# Patient Record
Sex: FEMALE | Race: OTHER | HISPANIC OR LATINO
[De-identification: names, ages, dates, MRNs, and addresses within clinical notes are randomized per-mention and may not be internally consistent; named-entity substitution may affect disease eponyms.]

---

## 2017-03-22 ENCOUNTER — APPOINTMENT (OUTPATIENT)
Dept: OTHER | Facility: CLINIC | Age: 52
End: 2017-03-22

## 2017-03-22 VITALS
HEART RATE: 80 BPM | OXYGEN SATURATION: 100 % | DIASTOLIC BLOOD PRESSURE: 69 MMHG | RESPIRATION RATE: 14 BRPM | SYSTOLIC BLOOD PRESSURE: 111 MMHG

## 2017-03-22 DIAGNOSIS — Z83.3 FAMILY HISTORY OF DIABETES MELLITUS: ICD-10-CM

## 2017-04-12 ENCOUNTER — APPOINTMENT (OUTPATIENT)
Dept: OTHER | Facility: CLINIC | Age: 52
End: 2017-04-12

## 2017-04-12 VITALS
HEART RATE: 77 BPM | SYSTOLIC BLOOD PRESSURE: 106 MMHG | DIASTOLIC BLOOD PRESSURE: 69 MMHG | OXYGEN SATURATION: 97 % | HEIGHT: 58 IN | BODY MASS INDEX: 30.44 KG/M2 | WEIGHT: 145 LBS

## 2017-05-17 ENCOUNTER — APPOINTMENT (OUTPATIENT)
Dept: OTHER | Facility: CLINIC | Age: 52
End: 2017-05-17

## 2017-08-15 ENCOUNTER — RESULT CHARGE (OUTPATIENT)
Age: 52
End: 2017-08-15

## 2017-08-16 ENCOUNTER — APPOINTMENT (OUTPATIENT)
Dept: OTHER | Facility: CLINIC | Age: 52
End: 2017-08-16
Payer: COMMERCIAL

## 2017-08-16 VITALS
DIASTOLIC BLOOD PRESSURE: 66 MMHG | SYSTOLIC BLOOD PRESSURE: 102 MMHG | HEIGHT: 58 IN | BODY MASS INDEX: 29.39 KG/M2 | HEART RATE: 66 BPM | WEIGHT: 140 LBS | OXYGEN SATURATION: 100 % | RESPIRATION RATE: 14 BRPM

## 2017-08-16 VITALS
WEIGHT: 140 LBS | SYSTOLIC BLOOD PRESSURE: 102 MMHG | HEART RATE: 66 BPM | OXYGEN SATURATION: 100 % | DIASTOLIC BLOOD PRESSURE: 66 MMHG | BODY MASS INDEX: 29.39 KG/M2 | HEIGHT: 58 IN

## 2017-08-16 PROCEDURE — 99214 OFFICE O/P EST MOD 30 MIN: CPT | Mod: 25

## 2017-08-16 PROCEDURE — 96150: CPT

## 2017-08-16 PROCEDURE — 99396 PREV VISIT EST AGE 40-64: CPT

## 2017-08-16 PROCEDURE — 94010 BREATHING CAPACITY TEST: CPT

## 2017-08-16 RX ORDER — AZELASTINE HYDROCHLORIDE 137 UG/1
0.1 SPRAY, METERED NASAL DAILY
Qty: 1 | Refills: 5 | Status: COMPLETED | COMMUNITY
Start: 2017-03-22 | End: 2017-08-16

## 2017-08-16 RX ORDER — TRIAMCINOLONE ACETONIDE 55 UG/1
55 SPRAY, METERED NASAL TWICE DAILY
Qty: 1 | Refills: 5 | Status: COMPLETED | COMMUNITY
Start: 2017-03-22 | End: 2017-08-16

## 2017-08-18 LAB
ALBUMIN SERPL ELPH-MCNC: 3.7 G/DL
ALP BLD-CCNC: 170 U/L
ALT SERPL-CCNC: 43 U/L
ANION GAP SERPL CALC-SCNC: 18 MMOL/L
APPEARANCE: CLEAR
AST SERPL-CCNC: 51 U/L
BASOPHILS # BLD AUTO: 0.04 K/UL
BASOPHILS NFR BLD AUTO: 0.5 %
BILIRUB SERPL-MCNC: 0.8 MG/DL
BILIRUBIN URINE: NEGATIVE
BLOOD URINE: ABNORMAL
BUN SERPL-MCNC: 21 MG/DL
CALCIUM SERPL-MCNC: 9.4 MG/DL
CHLORIDE SERPL-SCNC: 104 MMOL/L
CHOLEST SERPL-MCNC: 132 MG/DL
CHOLEST/HDLC SERPL: 2.2 RATIO
CO2 SERPL-SCNC: 21 MMOL/L
COLOR: YELLOW
CREAT SERPL-MCNC: 0.86 MG/DL
EOSINOPHIL # BLD AUTO: 0.14 K/UL
EOSINOPHIL NFR BLD AUTO: 1.9 %
GLUCOSE QUALITATIVE U: NORMAL MG/DL
GLUCOSE SERPL-MCNC: 144 MG/DL
HCT VFR BLD CALC: 38.2 %
HDLC SERPL-MCNC: 60 MG/DL
HGB BLD-MCNC: 12.3 G/DL
IMM GRANULOCYTES NFR BLD AUTO: 0.1 %
KETONES URINE: NEGATIVE
LDLC SERPL CALC-MCNC: 58 MG/DL
LEUKOCYTE ESTERASE URINE: NEGATIVE
LYMPHOCYTES # BLD AUTO: 3.07 K/UL
LYMPHOCYTES NFR BLD AUTO: 41.3 %
MAN DIFF?: NORMAL
MCHC RBC-ENTMCNC: 29.2 PG
MCHC RBC-ENTMCNC: 32.2 GM/DL
MCV RBC AUTO: 90.7 FL
MONOCYTES # BLD AUTO: 0.54 K/UL
MONOCYTES NFR BLD AUTO: 7.3 %
NEUTROPHILS # BLD AUTO: 3.64 K/UL
NEUTROPHILS NFR BLD AUTO: 48.9 %
NITRITE URINE: NEGATIVE
PH URINE: 6.5
PLATELET # BLD AUTO: 129 K/UL
POTASSIUM SERPL-SCNC: 4.4 MMOL/L
PROT SERPL-MCNC: 7.4 G/DL
PROTEIN URINE: NEGATIVE MG/DL
RBC # BLD: 4.21 M/UL
RBC # FLD: 16.7 %
SODIUM SERPL-SCNC: 143 MMOL/L
SPECIFIC GRAVITY URINE: 1.02
TRIGL SERPL-MCNC: 68 MG/DL
UROBILINOGEN URINE: NORMAL MG/DL
WBC # FLD AUTO: 7.44 K/UL

## 2017-09-26 ENCOUNTER — APPOINTMENT (OUTPATIENT)
Dept: PLASTIC SURGERY | Facility: CLINIC | Age: 52
End: 2017-09-26

## 2017-10-25 ENCOUNTER — APPOINTMENT (OUTPATIENT)
Dept: PLASTIC SURGERY | Facility: CLINIC | Age: 52
End: 2017-10-25

## 2018-01-03 ENCOUNTER — APPOINTMENT (OUTPATIENT)
Dept: OTHER | Facility: CLINIC | Age: 53
End: 2018-01-03
Payer: COMMERCIAL

## 2018-01-03 ENCOUNTER — APPOINTMENT (OUTPATIENT)
Dept: OTHER | Facility: CLINIC | Age: 53
End: 2018-01-03

## 2018-01-03 VITALS
HEART RATE: 76 BPM | WEIGHT: 134 LBS | SYSTOLIC BLOOD PRESSURE: 129 MMHG | OXYGEN SATURATION: 100 % | HEIGHT: 58 IN | DIASTOLIC BLOOD PRESSURE: 82 MMHG | BODY MASS INDEX: 28.13 KG/M2 | RESPIRATION RATE: 15 BRPM

## 2018-01-03 PROCEDURE — 99214 OFFICE O/P EST MOD 30 MIN: CPT

## 2018-01-03 RX ORDER — ALBUTEROL SULFATE 90 UG/1
108 (90 BASE) AEROSOL, METERED RESPIRATORY (INHALATION) EVERY 6 HOURS
Qty: 1 | Refills: 6 | Status: COMPLETED | COMMUNITY
Start: 2017-03-22 | End: 2018-01-03

## 2018-02-23 ENCOUNTER — APPOINTMENT (OUTPATIENT)
Dept: OTOLARYNGOLOGY | Facility: CLINIC | Age: 53
End: 2018-02-23

## 2018-04-04 ENCOUNTER — APPOINTMENT (OUTPATIENT)
Dept: OTHER | Facility: CLINIC | Age: 53
End: 2018-04-04
Payer: COMMERCIAL

## 2018-04-04 VITALS
WEIGHT: 133 LBS | OXYGEN SATURATION: 100 % | HEART RATE: 73 BPM | BODY MASS INDEX: 27.92 KG/M2 | SYSTOLIC BLOOD PRESSURE: 100 MMHG | RESPIRATION RATE: 16 BRPM | HEIGHT: 58 IN | DIASTOLIC BLOOD PRESSURE: 63 MMHG

## 2018-04-04 DIAGNOSIS — L29.9 PRURITUS, UNSPECIFIED: ICD-10-CM

## 2018-04-04 PROCEDURE — 99214 OFFICE O/P EST MOD 30 MIN: CPT

## 2018-04-04 RX ORDER — FLUTICASONE PROPIONATE 50 UG/1
50 SPRAY, METERED NASAL DAILY
Qty: 1 | Refills: 6 | Status: COMPLETED | COMMUNITY
Start: 2017-08-16 | End: 2018-04-04

## 2018-04-04 RX ORDER — ALUMINUM HYDROXIDE AND MAGNESIUM TRISILICATE 80; 14.2 MG/1; MG/1
80-14.2 TABLET, CHEWABLE ORAL 4 TIMES DAILY
Qty: 120 | Refills: 5 | Status: COMPLETED | COMMUNITY
Start: 2018-01-03 | End: 2018-04-04

## 2018-06-27 ENCOUNTER — APPOINTMENT (OUTPATIENT)
Dept: OTHER | Facility: CLINIC | Age: 53
End: 2018-06-27
Payer: COMMERCIAL

## 2018-06-27 VITALS — SYSTOLIC BLOOD PRESSURE: 100 MMHG | RESPIRATION RATE: 16 BRPM | DIASTOLIC BLOOD PRESSURE: 60 MMHG | HEART RATE: 74 BPM

## 2018-06-27 PROCEDURE — 99214 OFFICE O/P EST MOD 30 MIN: CPT

## 2018-06-27 RX ORDER — HYDROCORTISONE 0.5 G/100G
0.5 CREAM TOPICAL 3 TIMES DAILY
Qty: 1 | Refills: 6 | Status: COMPLETED | COMMUNITY
Start: 2018-04-04 | End: 2018-06-27

## 2018-06-27 RX ORDER — TRIAMCINOLONE ACETONIDE 55 UG/1
55 SPRAY, METERED NASAL TWICE DAILY
Qty: 1 | Refills: 6 | Status: COMPLETED | COMMUNITY
Start: 2018-04-04 | End: 2018-06-27

## 2018-10-03 ENCOUNTER — APPOINTMENT (OUTPATIENT)
Dept: OTHER | Facility: CLINIC | Age: 53
End: 2018-10-03

## 2018-11-28 ENCOUNTER — APPOINTMENT (OUTPATIENT)
Dept: OTHER | Facility: CLINIC | Age: 53
End: 2018-11-28
Payer: COMMERCIAL

## 2018-11-28 ENCOUNTER — LABORATORY RESULT (OUTPATIENT)
Age: 53
End: 2018-11-28

## 2018-11-28 VITALS
DIASTOLIC BLOOD PRESSURE: 68 MMHG | SYSTOLIC BLOOD PRESSURE: 102 MMHG | HEART RATE: 58 BPM | BODY MASS INDEX: 27.29 KG/M2 | OXYGEN SATURATION: 99 % | WEIGHT: 130 LBS | RESPIRATION RATE: 16 BRPM | HEIGHT: 58 IN

## 2018-11-28 PROCEDURE — 99396 PREV VISIT EST AGE 40-64: CPT | Mod: 25

## 2018-11-28 PROCEDURE — 99214 OFFICE O/P EST MOD 30 MIN: CPT | Mod: 25

## 2018-11-28 PROCEDURE — 96150: CPT

## 2018-11-28 PROCEDURE — 94010 BREATHING CAPACITY TEST: CPT

## 2018-11-28 RX ORDER — MONTELUKAST 10 MG/1
10 TABLET, FILM COATED ORAL
Qty: 30 | Refills: 6 | Status: COMPLETED | COMMUNITY
Start: 2018-04-04 | End: 2018-11-28

## 2018-11-29 LAB
ALBUMIN SERPL ELPH-MCNC: 3.6 G/DL
ALP BLD-CCNC: 153 U/L
ALT SERPL-CCNC: 30 U/L
ANION GAP SERPL CALC-SCNC: 9 MMOL/L
APPEARANCE: CLEAR
AST SERPL-CCNC: 40 U/L
BASOPHILS # BLD AUTO: 0.04 K/UL
BASOPHILS NFR BLD AUTO: 0.8 %
BILIRUB SERPL-MCNC: 0.8 MG/DL
BILIRUBIN URINE: NEGATIVE
BLOOD URINE: ABNORMAL
BUN SERPL-MCNC: 13 MG/DL
CALCIUM SERPL-MCNC: 10 MG/DL
CHLORIDE SERPL-SCNC: 109 MMOL/L
CHOLEST SERPL-MCNC: 136 MG/DL
CHOLEST/HDLC SERPL: 2.3 RATIO
CO2 SERPL-SCNC: 24 MMOL/L
COLOR: YELLOW
CREAT SERPL-MCNC: 0.6 MG/DL
EOSINOPHIL # BLD AUTO: 0.23 K/UL
EOSINOPHIL NFR BLD AUTO: 4.3 %
GLUCOSE QUALITATIVE U: NEGATIVE MG/DL
GLUCOSE SERPL-MCNC: 97 MG/DL
HCT VFR BLD CALC: 42.1 %
HDLC SERPL-MCNC: 58 MG/DL
HGB BLD-MCNC: 13.8 G/DL
IMM GRANULOCYTES NFR BLD AUTO: 0 %
KETONES URINE: NEGATIVE
LDLC SERPL CALC-MCNC: 53 MG/DL
LEUKOCYTE ESTERASE URINE: NEGATIVE
LYMPHOCYTES # BLD AUTO: 1.9 K/UL
LYMPHOCYTES NFR BLD AUTO: 35.7 %
MAN DIFF?: NORMAL
MCHC RBC-ENTMCNC: 32.3 PG
MCHC RBC-ENTMCNC: 32.8 GM/DL
MCV RBC AUTO: 98.6 FL
MONOCYTES # BLD AUTO: 0.43 K/UL
MONOCYTES NFR BLD AUTO: 8.1 %
NEUTROPHILS # BLD AUTO: 2.72 K/UL
NEUTROPHILS NFR BLD AUTO: 51.1 %
NITRITE URINE: NEGATIVE
PH URINE: 8.5
PLATELET # BLD AUTO: 118 K/UL
POTASSIUM SERPL-SCNC: 4.2 MMOL/L
PROT SERPL-MCNC: 6.9 G/DL
PROTEIN URINE: NEGATIVE MG/DL
RBC # BLD: 4.27 M/UL
RBC # FLD: 14 %
SODIUM SERPL-SCNC: 142 MMOL/L
SPECIFIC GRAVITY URINE: 1.02
TRIGL SERPL-MCNC: 127 MG/DL
UROBILINOGEN URINE: NEGATIVE MG/DL
WBC # FLD AUTO: 5.32 K/UL

## 2018-12-02 ENCOUNTER — FORM ENCOUNTER (OUTPATIENT)
Age: 53
End: 2018-12-02

## 2018-12-07 ENCOUNTER — MESSAGE (OUTPATIENT)
Age: 53
End: 2018-12-07

## 2019-02-10 ENCOUNTER — FORM ENCOUNTER (OUTPATIENT)
Age: 54
End: 2019-02-10

## 2019-04-03 ENCOUNTER — APPOINTMENT (OUTPATIENT)
Dept: OTHER | Facility: CLINIC | Age: 54
End: 2019-04-03
Payer: COMMERCIAL

## 2019-04-03 VITALS
OXYGEN SATURATION: 98 % | BODY MASS INDEX: 28.13 KG/M2 | RESPIRATION RATE: 18 BRPM | HEART RATE: 76 BPM | SYSTOLIC BLOOD PRESSURE: 103 MMHG | HEIGHT: 58 IN | WEIGHT: 134 LBS | DIASTOLIC BLOOD PRESSURE: 68 MMHG | TEMPERATURE: 97.6 F

## 2019-04-03 PROCEDURE — 99214 OFFICE O/P EST MOD 30 MIN: CPT

## 2019-04-03 RX ORDER — PANTOPRAZOLE 20 MG/1
20 TABLET, DELAYED RELEASE ORAL TWICE DAILY
Qty: 60 | Refills: 6 | Status: COMPLETED | COMMUNITY
Start: 2017-03-22 | End: 2019-04-03

## 2019-04-03 RX ORDER — FLUTICASONE PROPIONATE 50 UG/1
50 SPRAY, METERED NASAL DAILY
Qty: 1 | Refills: 6 | Status: COMPLETED | COMMUNITY
Start: 2018-11-28 | End: 2019-04-03

## 2019-04-03 NOTE — ASSESSMENT
[FreeTextEntry1] : labs 3/25/19: platelest 112.000; glucose 140 with normal hga1c, alk phos 196 (nl 147); ast 40 (nl 39); tg 220no rmal thyroid. c4 15 (NL 19-52), 3+ BLOOD IN URINE, CRP NEG, RF NEG,anticcp neg, dnads nl, sm ab, scl ab, ssa and ssb all nromal; ventura d 49; yurine culture 20K-50K e coli. emg/ncv 3/25/19: l5-l4 nerve root irritation on L and b mild sensory cts \par ct sinues 3/9/19: r deviated septum with spur with mucosas nasal hyperthrophy, mild mucosal thickening omu infundibiula. \par a. overall stable, persistent nasal bleeding. cts is active and lumbar radiculopathy is active.\par p. will stop flonase because of nasal bleeding, change to ipratropium. pt is under care with an ent but will get a second opinion through the program. local meds recommended due to cirrhosis: lidocaine patches and diclofenac cream for pain. splints for cts and home exercise program. will request colonoscopy as pt states she has not had one in several years. no c4, case is closed, pending ssdb. pt is totally disabled due to multiple medical conditions. rtc in 4 mo. \par

## 2019-04-03 NOTE — HISTORY OF PRESENT ILLNESS
[FreeTextEntry1] : certified for rhinitis, laryngitis, copd, gerd, cts, cervical and lumbar radiculopathy\par s. patient reports worsening numbness in both hands. she was doing some hand activities and this resulted in worsening pain. was sent to a rheumatologist who diagnosed her with cts. continues bleeding from her nose, especially right nostril. continues with headache. reports numbness in her legs, unable to stand for long periods of time, unable to stand more than 30 min. reportedly got a ct scan of her sinuses but has not gotten the results. \par her cirrhosis is stable.\par ss is pending. 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

## 2019-04-03 NOTE — PHYSICAL EXAM
[General Appearance - Alert] : alert [General Appearance - In No Acute Distress] : in no acute distress [General Appearance - Well Nourished] : well nourished [General Appearance - Well Developed] : well developed [Sclera] : the sclera and conjunctiva were normal [PERRL With Normal Accommodation] : pupils were equal in size, round, and reactive to light [Extraocular Movements] : extraocular movements were intact [Outer Ear] : the ears and nose were normal in appearance [Neck Appearance] : the appearance of the neck was normal [Neck Cervical Mass (___cm)] : no neck mass was observed [Jugular Venous Distention Increased] : there was no jugular-venous distention [Thyroid Diffuse Enlargement] : the thyroid was not enlarged [Respiration, Rhythm And Depth] : normal respiratory rhythm and effort [Exaggerated Use Of Accessory Muscles For Inspiration] : no accessory muscle use [Auscultation Breath Sounds / Voice Sounds] : lungs were clear to auscultation bilaterally [Chest Palpation] : palpation of the chest revealed no abnormalities [Apical Impulse] : the apical impulse was normal [Heart Rate And Rhythm] : heart rate was normal and rhythm regular [Heart Sounds] : normal S1 and S2 [Heart Sounds Gallop] : no gallops [Murmurs] : no murmurs [Edema] : there was no peripheral edema [Bowel Sounds] : normal bowel sounds [Abdomen Soft] : soft [Abdomen Tenderness] : non-tender [] : no hepato-splenomegaly [Abdomen Mass (___ Cm)] : no abdominal mass palpated [Cervical Lymph Nodes Enlarged Posterior Bilaterally] : posterior cervical [Cervical Lymph Nodes Enlarged Anterior Bilaterally] : anterior cervical [Supraclavicular Lymph Nodes Enlarged Bilaterally] : supraclavicular [Axillary Lymph Nodes Enlarged Bilaterally] : axillary [FreeTextEntry1] : positive tinel on r side to 2-3 digits; on l to 5 th, dysesthesia to touch r index and l pinky, strength 4/5 r hand.

## 2019-05-20 ENCOUNTER — APPOINTMENT (OUTPATIENT)
Dept: GASTROENTEROLOGY | Facility: CLINIC | Age: 54
End: 2019-05-20

## 2019-06-19 ENCOUNTER — APPOINTMENT (OUTPATIENT)
Dept: OTHER | Facility: CLINIC | Age: 54
End: 2019-06-19
Payer: COMMERCIAL

## 2019-06-19 VITALS
RESPIRATION RATE: 18 BRPM | TEMPERATURE: 98.5 F | HEART RATE: 72 BPM | BODY MASS INDEX: 28.34 KG/M2 | SYSTOLIC BLOOD PRESSURE: 99 MMHG | DIASTOLIC BLOOD PRESSURE: 63 MMHG | OXYGEN SATURATION: 98 % | HEIGHT: 58 IN | WEIGHT: 135 LBS

## 2019-06-19 PROCEDURE — 99213 OFFICE O/P EST LOW 20 MIN: CPT

## 2019-06-19 NOTE — HISTORY OF PRESENT ILLNESS
[FreeTextEntry1] : certified for rhinitis, laryngitis, coopd, gerd, cts, cervical and lumbar radiculopathy\par s. patient here for disability papers. patient also reports some rash in her skin, itchy. she is under dermatology evaluation for this. \par she is due for a regular apptm in a few weeks.

## 2019-06-19 NOTE — ASSESSMENT
[FreeTextEntry1] : a. documentation completed. patient has multiple medical conditions that render her totally disabled for any regular occupation. she has positive findings on physical exam and tests that support her diagnoses. she has been certified by the wtc program for the conditions above mentioned with positive clinical history, physical exams and supporting tests. patient has also mental health conditions related to her wtc exposure as well. patient has other physical conditions that add to her overall clinical conditoin, especially cirrhosis, with low platelet count. it is my impression ,with sufficient degree of medical certainty, tht this patient is totally disabled for any regular, full time occupation. she is totally disabled from a respiratory point of view for any occupations that entail exposure to fumes, dust, extremes of temperature and humidity, strong odors, chemicals. \par p. counseling and support. documentation completed. rtc for regular visit.

## 2019-06-19 NOTE — PHYSICAL EXAM
[FreeTextEntry1] : neck rotation to 45-60 degrees b with pain on the same side of rotation. some neck spasm. positive spurling's b for neck pain. shoulder abd to 130 degrees, positive impingement sign. positive tinel's at b elbows to 4-5 fingers. pain on forearm supination. postiive tinel's at b ulnar nerves at guyon's with anatomic distribution. no sensory changes at skin.  strength 4/5 b. back flexion to 60 degrees, extension to 0-10 degrees. abnormal reflexes right knee, left ++. no sensory changes in skin. weakness in b lower extremities to opposed knee extension/flexion.

## 2019-07-22 ENCOUNTER — APPOINTMENT (OUTPATIENT)
Dept: GASTROENTEROLOGY | Facility: CLINIC | Age: 54
End: 2019-07-22

## 2019-08-07 ENCOUNTER — APPOINTMENT (OUTPATIENT)
Dept: OTHER | Facility: CLINIC | Age: 54
End: 2019-08-07

## 2020-02-26 ENCOUNTER — APPOINTMENT (OUTPATIENT)
Dept: OTHER | Facility: CLINIC | Age: 55
End: 2020-02-26
Payer: COMMERCIAL

## 2020-02-26 VITALS
HEIGHT: 58 IN | TEMPERATURE: 97.8 F | OXYGEN SATURATION: 98 % | DIASTOLIC BLOOD PRESSURE: 57 MMHG | RESPIRATION RATE: 18 BRPM | BODY MASS INDEX: 28.34 KG/M2 | HEART RATE: 64 BPM | WEIGHT: 135 LBS | SYSTOLIC BLOOD PRESSURE: 94 MMHG

## 2020-02-26 PROCEDURE — 99396 PREV VISIT EST AGE 40-64: CPT | Mod: 25

## 2020-02-26 PROCEDURE — 99214 OFFICE O/P EST MOD 30 MIN: CPT | Mod: 25

## 2020-02-26 PROCEDURE — 94010 BREATHING CAPACITY TEST: CPT

## 2020-02-26 RX ORDER — PROMETHAZINE HYDROCHLORIDE AND DEXTROMETHORPHAN HYDROBROMIDE ORAL SOLUTION 15; 6.25 MG/5ML; MG/5ML
6.25-15 SOLUTION ORAL
Qty: 1 | Refills: 3 | Status: COMPLETED | COMMUNITY
Start: 2017-08-16 | End: 2020-02-26

## 2020-02-26 RX ORDER — LIDOCAINE 5% 700 MG/1
5 PATCH TOPICAL
Qty: 1 | Refills: 6 | Status: COMPLETED | COMMUNITY
Start: 2017-03-22 | End: 2020-02-26

## 2020-02-26 RX ORDER — CLOTRIMAZOLE AND BETAMETHASONE DIPROPIONATE 10; .5 MG/G; MG/G
1-0.05 CREAM TOPICAL TWICE DAILY
Qty: 1 | Refills: 6 | Status: ACTIVE | COMMUNITY
Start: 2018-11-28 | End: 1900-01-01

## 2020-02-26 RX ORDER — CLOTRIMAZOLE AND BETAMETHASONE DIPROPIONATE 10; .5 MG/ML; MG/ML
1-0.05 LOTION TOPICAL TWICE DAILY
Qty: 2 | Refills: 6 | Status: COMPLETED | COMMUNITY
Start: 2018-06-27 | End: 2020-02-26

## 2020-02-26 RX ORDER — NAPROXEN 500 MG/1
500 TABLET ORAL
Qty: 60 | Refills: 6 | Status: COMPLETED | COMMUNITY
Start: 2017-08-16 | End: 2020-02-26

## 2020-02-26 NOTE — PHYSICAL EXAM
[General Appearance - Alert] : alert [General Appearance - In No Acute Distress] : in no acute distress [General Appearance - Well Nourished] : well nourished [General Appearance - Well Developed] : well developed [Sclera] : the sclera and conjunctiva were normal [Extraocular Movements] : extraocular movements were intact [PERRL With Normal Accommodation] : pupils were equal in size, round, and reactive to light [Outer Ear] : the ears and nose were normal in appearance [Neck Appearance] : the appearance of the neck was normal [Neck Cervical Mass (___cm)] : no neck mass was observed [Thyroid Diffuse Enlargement] : the thyroid was not enlarged [Jugular Venous Distention Increased] : there was no jugular-venous distention [Exaggerated Use Of Accessory Muscles For Inspiration] : no accessory muscle use [Auscultation Breath Sounds / Voice Sounds] : lungs were clear to auscultation bilaterally [Respiration, Rhythm And Depth] : normal respiratory rhythm and effort [Chest Palpation] : palpation of the chest revealed no abnormalities [Apical Impulse] : the apical impulse was normal [Heart Sounds] : normal S1 and S2 [Heart Rate And Rhythm] : heart rate was normal and rhythm regular [Edema] : there was no peripheral edema [Heart Sounds Gallop] : no gallops [Murmurs] : no murmurs [Abdomen Soft] : soft [Bowel Sounds] : normal bowel sounds [] : no hepato-splenomegaly [Abdomen Mass (___ Cm)] : no abdominal mass palpated [Cervical Lymph Nodes Enlarged Anterior Bilaterally] : anterior cervical [Supraclavicular Lymph Nodes Enlarged Bilaterally] : supraclavicular [Cervical Lymph Nodes Enlarged Posterior Bilaterally] : posterior cervical [Axillary Lymph Nodes Enlarged Bilaterally] : axillary [FreeTextEntry1] : patient has a tic on the right side of her face.

## 2020-02-26 NOTE — DISCUSSION/SUMMARY
[Patient seen for WTC Monitoring ___] : Patient was seen for WTC monitoring [unfilled] [Please See Note in Chart and Documentation in Trial DB] : Please see note in chart and documentation in Trial DB. [FreeTextEntry3] : certified for rhinitis, laryngitis, copd, gerd, cts, cervical and lumbar radiculopathy\par s. patient reports worsening epigastric pain. she is on protonix one bid, which is not helping. \par she was seen by her pmd, got endoscopies, was found to have a gastric ulcer. \par has been referred for liver transplant program. \par continues with numbness in both hands. continues bleeding from her nose. reports numbness in her legs, unable to stand for long periods of time, unable to stand more than 30 min. \par patient was very sick in december, she got urinary bleeding. was treated by her pmd. \par patient got ss. patient is not working.\par Constitutional: alert, in no acute distress, well nourished and well developed . spontaneous twitching in her r side of the face. \par Eyes: the sclera and conjunctiva were normal, pupils were equal in size, round, and reactive to light and extraocular movements were intact. \par ENT: the ears and nose were normal in appearance. No bleeding noted. no swelling of turbinates. deviated septum to the right. no tenderness at sinuses. no secretions. oropharynx: scattered areas of swollen mucosa throughout, no secretions. \par Neck: the appearance of the neck was normal, the neck was supple, no neck mass was observed and the thyroid was not enlarged . there was no jugular-venous distention. \par Pulmonary: no respiratory distress, normal respiratory rhythm and effort, no accessory muscle use, lungs were clear to auscultation bilaterally and palpation of the chest revealed no abnormalities. \par Heart: the apical impulse was normal, heart rate was normal and rhythm regular, normal S1 and S2, no gallops and no murmurs. \par Vascular:. there was no peripheral edema. \par Abdomen: normal bowel sounds, soft, no hepato-splenomegaly and no abdominal mass palpated . tender at epigastrium no rebound. \par Lymphatics: The posterior cervical, anterior cervical, supraclavicular and axillary nodes were non-tender and normal size. \par Neurological:. patient has a tic on the right side of her face. \par a dn p. documentaion completed.

## 2020-02-26 NOTE — ASSESSMENT
[FreeTextEntry1] : med records' \par endoscpy 1-: esophagitis erosive, gastritis, gastric ulcer. overall negativ biopsies for ca. negative for h piory, \par colonoscpy 1-: hemorrhoids\par labs:  high iga, sl hig amilase, abnormal lfts, patelets 132, high pt, shantelle positive 1:160\par abdomen ct 1-: cirrhosis, recanalized umbilical vein, numeros bilateral renal calculi\par ct urogram 7-: bilateral calculi, cirrhosis, portal hypertension\par afsaneh today: fvc 1.94, 73%; fev1 1.71, 82%; ratio 88, 110%; flow volume normal, fet 3.5 sec; study shows restriction, loss of 107 ml/yr fev1 since 2013 (fev1 2.46)\par a. overall stable. persistent nasal bleeding. cirrhosis. has been found to have gi ulcer. \par p. continue ipratropium, olopatadine. continue local meds for musculoskeletal conditions due to cirrhosis: diclofenac cream. pantoprazole bid and sucralfate for gi ulcer. ventolin prn. will do endoscopy in some 3-4 mo to assess for gi ulcer. mammo and thyroid u/d today, pt reports a history of thyroid nodule. no c4, case is closed. pt is totally disabled due to multiple medical conditions. rtc in 4 mo. request to sign for long term pharmacy. \par

## 2020-02-26 NOTE — PHYSICAL EXAM
[General Appearance - Alert] : alert [General Appearance - In No Acute Distress] : in no acute distress [General Appearance - Well Nourished] : well nourished [Sclera] : the sclera and conjunctiva were normal [General Appearance - Well Developed] : well developed [Outer Ear] : the ears and nose were normal in appearance [PERRL With Normal Accommodation] : pupils were equal in size, round, and reactive to light [Extraocular Movements] : extraocular movements were intact [Neck Appearance] : the appearance of the neck was normal [Neck Cervical Mass (___cm)] : no neck mass was observed [Jugular Venous Distention Increased] : there was no jugular-venous distention [Thyroid Diffuse Enlargement] : the thyroid was not enlarged [Respiration, Rhythm And Depth] : normal respiratory rhythm and effort [Auscultation Breath Sounds / Voice Sounds] : lungs were clear to auscultation bilaterally [Exaggerated Use Of Accessory Muscles For Inspiration] : no accessory muscle use [Chest Palpation] : palpation of the chest revealed no abnormalities [Apical Impulse] : the apical impulse was normal [Heart Rate And Rhythm] : heart rate was normal and rhythm regular [Heart Sounds] : normal S1 and S2 [Murmurs] : no murmurs [Edema] : there was no peripheral edema [Heart Sounds Gallop] : no gallops [Bowel Sounds] : normal bowel sounds [Abdomen Soft] : soft [Abdomen Mass (___ Cm)] : no abdominal mass palpated [] : no hepato-splenomegaly [Cervical Lymph Nodes Enlarged Anterior Bilaterally] : anterior cervical [Cervical Lymph Nodes Enlarged Posterior Bilaterally] : posterior cervical [Supraclavicular Lymph Nodes Enlarged Bilaterally] : supraclavicular [Axillary Lymph Nodes Enlarged Bilaterally] : axillary [FreeTextEntry1] : patient has a tic on the right side of her face.

## 2020-02-26 NOTE — HISTORY OF PRESENT ILLNESS
[FreeTextEntry1] : certified for rhinitis, laryngitis, copd, gerd, cts, cervical and lumbar radiculopathy\par s. patient reports worsening epigastric pain. she is on protonix one bid, which is not helping. \par she was seen by her pmd, got endoscopies, was found to have a gastric ulcer. \par has been referred for liver transplant program. \par continues with numbness in both hands. continues bleeding from her nose. reports numbness in her legs, unable to stand for long periods of time, unable to stand more than 30 min. \par patient was very sick in december, she got urinary bleeding. was treated by her pmd. \par patient got ss. patient is not working.

## 2020-02-26 NOTE — HEALTH RISK ASSESSMENT
[Patient reported colonoscopy was abnormal] : Patient reported colonoscopy was abnormal [ColonoscopyDate] : 1/2020 [ColonoscopyComments] : hemorrhoids

## 2020-03-01 ENCOUNTER — FORM ENCOUNTER (OUTPATIENT)
Age: 55
End: 2020-03-01

## 2020-04-02 ENCOUNTER — FORM ENCOUNTER (OUTPATIENT)
Age: 55
End: 2020-04-02

## 2021-02-17 ENCOUNTER — APPOINTMENT (OUTPATIENT)
Dept: OTHER | Facility: CLINIC | Age: 56
End: 2021-02-17

## 2021-06-30 ENCOUNTER — APPOINTMENT (OUTPATIENT)
Dept: OTHER | Facility: CLINIC | Age: 56
End: 2021-06-30
Payer: COMMERCIAL

## 2021-06-30 DIAGNOSIS — H02.402 UNSPECIFIED PTOSIS OF LEFT EYELID: ICD-10-CM

## 2021-06-30 PROCEDURE — 99441: CPT | Mod: 95

## 2021-06-30 PROCEDURE — 99396 PREV VISIT EST AGE 40-64: CPT | Mod: 95

## 2021-06-30 RX ORDER — PANTOPRAZOLE 40 MG/1
40 TABLET, DELAYED RELEASE ORAL TWICE DAILY
Qty: 30 | Refills: 2 | Status: COMPLETED | COMMUNITY
Start: 2019-04-03 | End: 2021-06-30

## 2021-06-30 RX ORDER — DICLOFENAC SODIUM 10 MG/G
1 GEL TOPICAL DAILY
Qty: 1 | Refills: 2 | Status: COMPLETED | COMMUNITY
Start: 2017-03-22 | End: 2021-06-30

## 2021-06-30 RX ORDER — ACETAMINOPHEN 80 MG
2300-700 TABLET,CHEWABLE ORAL
Qty: 1 | Refills: 1 | Status: COMPLETED | COMMUNITY
Start: 2018-04-04 | End: 2021-06-30

## 2021-06-30 NOTE — ASSESSMENT
[FreeTextEntry1] : o. on the phone patient sounded in no difficulty breathing. she was alert and oriented x 3 and her repsonses were adequate. \par a. overall stable as per phone call. conditions are active. \par p. meds renewed, unchanged. ordered to mail order as local pharmacy does not take the program. pending ro repeat endoscopy once covid restrictions are lifted. had colonosocy in feb 2020. referred for thyroid u/s and cxr. recommended to disucss with pmd re: weight gain and eyelid dropping, recommended neuro eval. no c4, case is closed. pt is totally disabled due to multiple medical conditions. rtc in 4 mo. \par

## 2021-06-30 NOTE — HEALTH RISK ASSESSMENT
[Patient reported colonoscopy was abnormal] : Patient reported colonoscopy was abnormal [ColonoscopyDate] : 1/2020 [ColonoscopyComments] : hemorroids

## 2021-06-30 NOTE — HISTORY OF PRESENT ILLNESS
[Home] : at home, [unfilled] , at the time of the visit. [Medical Office: ([facility])___] : at the medical office located in  [Verbal consent obtained from patient] : the patient, [unfilled] [FreeTextEntry1] : certified for rhinitis, laryngitis, copd, gerd, cts, cervical and lumbar radiculopathy\par last seen feb 2020\par s. patient had covid infection in the beginning of the pandemic. she stayed at home. has now recovered.\par pt reports she has not used any meds for her wtc. her gerd is active, she states nexium worked better than pantoprazole. did not get follow up endoscopy and wants to wait a little longer to get a follow up. continues with back pain, reports cramps in her lower extremities. continues with numbness in both hands. \par reports that her left eyelid closes suddenly frequently with excessive lacrimation of the eye. \par is seeing her pmd, but no news on her liver issue. reportedly has gain a significant amount of weight. \par patient got ss. patient is not working.

## 2021-06-30 NOTE — DISCUSSION/SUMMARY
[Patient seen for WTC Monitoring ___] : Patient was seen for WTC monitoring [unfilled] [Please See Note in Chart and Documentation in Trial DB] : Please see note in chart and documentation in Trial DB. [FreeTextEntry3] : This telephonic visit was provided via audio only technology. The patient, ELMER BANG, was located at home, [phone], [place] [address] , at the time of the visit. \par The provider, GLORIA WASHBURN, was located at the medical office located in [place], [place] at the time of the visit. The patient, ELMER BANG and Provider participated in the telephonic visit. \par Verbal consent for telephonic services was given on June 30, 2021 by the patient, ELMER BANG. \par \par certified for rhinitis, laryngitis, copd, gerd, cts, cervical and lumbar radiculopathy\par last seen feb 2020\par s. patient had covid infection in the beginning of the pandemic. she stayed at home. has now recovered.\par pt reports she has not used any meds for her wtc. her gerd is active, she states nexium worked better than pantoprazole. did not get follow up endoscopy and wants to wait a little longer to get a follow up. continues with back pain, reports cramps in her lower extremities. continues with numbness in both hands. \par reports that her left eyelid closes suddenly frequently with excessive lacrimation of the eye. \par is seeing her pmd, but no news on her liver issue. reportedly has gain a significant amount of weight. \par patient got ss. patient is not working. \par o. over telephonepatient sounded in no difficutly breathng, she was alert and oriented x 3 and her responses were adequate.\par a and p. documenation compelted.\par i spent some 10 min on the phone with pt.

## 2021-06-30 NOTE — HISTORY OF PRESENT ILLNESS
[Home] : at home, [unfilled] , at the time of the visit. [Medical Office: ([facility])___] : at the medical office located in  [Verbal consent obtained from patient] : the patient, [unfilled] [FreeTextEntry1] : certified for rhinitis, laryngitis, copd, gerd, cts, cervical and lumbar radiculopathy\par last seen feb 2020\par s. patient had covid infection in the beginning of the pandemic. she stayed at home. has now recovered.\par pt reports she has not used any meds for her wtc. her gerd is active, she states nexium worked better than pantoprazole. did not get follow up endoscopy and wants to wait a little longer to get a follow up. continues with back pain, reports cramps in her lower extremities. continues with numbness in both hands. \par reports that her left eyelid closes suddenly frequently with excessive lacrimation of the eye. \par is seeing her pmd, but no news on her liver issue. reportedly has gain a significant amount of weight. \par patient got ss. patient is not working.

## 2021-06-30 NOTE — DISCUSSION/SUMMARY
[Patient seen for WTC Monitoring ___] : Patient was seen for WTC monitoring [unfilled] [Please See Note in Chart and Documentation in Trial DB] : Please see note in chart and documentation in Trial DB. [FreeTextEntry3] : This telephonic visit was provided via audio only technology. The patient, ELMER BANG, was located at home, [phone], [place] [address] , at the time of the visit. \par The provider, GLORIA WASHBURN, was located at the medical office located in [place], [place] at the time of the visit. The patient, ELMER BANG and Provider participated in the telephonic visit. \par Verbal consent for telephonic services was given on June 30, 2021 by the patient, ELMER BANG. \par \par certified for rhinitis, laryngitis, copd, gerd, cts, cervical and lumbar radiculopathy\par last seen feb 2020\par s. patient had covid infection in the beginning of the pandemic. she stayed at home. has now recovered.\par pt reports she has not used any meds for her wtc. her gerd is active, she states nexium worked better than pantoprazole. did not get follow up endoscopy and wants to wait a little longer to get a follow up. continues with back pain, reports cramps in her lower extremities. continues with numbness in both hands. \par reports that her left eyelid closes suddenly frequently with excessive lacrimation of the eye. \par is seeing her pmd, but no news on her liver issue. reportedly has gain a significant amount of weight. \par patient got ss. patient is not working. \par o. over telephonepatient sounded in no difficutly breathng, she was alert and oriented x 3 and her responses were adequate.\par a and p. documenation compelted.\par i spent some 10 min on the phone with pt.

## 2021-09-21 ENCOUNTER — RX RENEWAL (OUTPATIENT)
Age: 56
End: 2021-09-21

## 2021-10-27 ENCOUNTER — APPOINTMENT (OUTPATIENT)
Dept: OTHER | Facility: CLINIC | Age: 56
End: 2021-10-27
Payer: COMMERCIAL

## 2021-10-27 DIAGNOSIS — K74.60 UNSPECIFIED CIRRHOSIS OF LIVER: ICD-10-CM

## 2021-10-27 PROCEDURE — 99441: CPT | Mod: 95

## 2021-10-27 RX ORDER — FEXOFENADINE HYDROCHLORIDE AND PSEUDOEPHEDRINE HYDROCHLORIDE 180; 240 MG/1; MG/1
180-240 TABLET, FILM COATED, EXTENDED RELEASE ORAL DAILY
Qty: 90 | Refills: 1 | Status: COMPLETED | COMMUNITY
Start: 2017-04-12 | End: 2021-10-27

## 2021-10-27 RX ORDER — SUCRALFATE 1 G/1
1 TABLET ORAL
Qty: 270 | Refills: 1 | Status: COMPLETED | COMMUNITY
Start: 2018-04-04 | End: 2021-10-27

## 2021-10-27 RX ORDER — DICLOFENAC SODIUM 1% 10 MG/G
1 GEL TOPICAL
Qty: 3 | Refills: 1 | Status: COMPLETED | COMMUNITY
Start: 2021-06-30 | End: 2021-10-27

## 2021-10-27 NOTE — HISTORY OF PRESENT ILLNESS
[FreeTextEntry1] : certified for rhinitis, laryngitis, copd, gerd, cts, cervical and lumbar radiculopathy\par visit conducted in [place]\par s. patient got some of the prescribed meds. her gerd is better on meds, but she is only using esomeprazole, she did not get sucralfate. continues with back pain, reports cramps in her lower extremities. continues with numbness in both hands. diclofenac helps but would prefer to have it in patches rather than cream.\par continues with closing of her left eyelid, that occurs suddenly and is worsening. also reports bleeding in the urine.\par is under care with pmd for cirrhosis. has gain a significant amount of weight. \par she reprotedly did nto get any meds prescribed except, apparently, for nexium. \par patient got ss. patient is not working.

## 2021-10-27 NOTE — ASSESSMENT
[FreeTextEntry1] : o. on the phone patient sounded in no difficulty breathing. she was alert and oriented x 3 and her responses were adequate. \par a. gerd is apparently better on meds. active cirrhosis. hematuria? ptosis?. \par p. meds renewed. will stop all oral meds except esomeprazole. will change diclofenac cream to patches as per pt's preference. had colonosocy in feb 2020. referred for thyroid u/s and cxr, and for neuro and urology evals re: ptosis and hematuria. no c4, case is closed. pt is totally disabled due to multiple medical conditions. rtc in 2 mo. shimon try to get records from pmd. \par

## 2021-11-29 ENCOUNTER — RX RENEWAL (OUTPATIENT)
Age: 56
End: 2021-11-29

## 2022-01-10 ENCOUNTER — RX RENEWAL (OUTPATIENT)
Age: 57
End: 2022-01-10

## 2022-01-11 ENCOUNTER — RX RENEWAL (OUTPATIENT)
Age: 57
End: 2022-01-11

## 2022-06-29 ENCOUNTER — APPOINTMENT (OUTPATIENT)
Dept: OTHER | Facility: CLINIC | Age: 57
End: 2022-06-29
Payer: SELF-PAY

## 2022-06-29 ENCOUNTER — LABORATORY RESULT (OUTPATIENT)
Age: 57
End: 2022-06-29

## 2022-06-29 VITALS
OXYGEN SATURATION: 99 % | BODY MASS INDEX: 29.18 KG/M2 | WEIGHT: 139 LBS | DIASTOLIC BLOOD PRESSURE: 58 MMHG | HEIGHT: 58 IN | HEART RATE: 61 BPM | TEMPERATURE: 97.4 F | SYSTOLIC BLOOD PRESSURE: 105 MMHG

## 2022-06-29 DIAGNOSIS — E66.9 OBESITY, UNSPECIFIED: ICD-10-CM

## 2022-06-29 DIAGNOSIS — K21.9 GASTRO-ESOPHAGEAL REFLUX DISEASE W/OUT ESOPHAGITIS: ICD-10-CM

## 2022-06-29 DIAGNOSIS — G56.00 CARPAL TUNNEL SYNDROME, UNSPECIFIED UPPER LIMB: ICD-10-CM

## 2022-06-29 PROCEDURE — 99396 PREV VISIT EST AGE 40-64: CPT

## 2022-06-29 PROCEDURE — 99214 OFFICE O/P EST MOD 30 MIN: CPT | Mod: 25

## 2022-06-29 RX ORDER — DICLOFENAC SODIUM 75 MG/1
75 TABLET, DELAYED RELEASE ORAL
Qty: 60 | Refills: 0 | Status: COMPLETED | COMMUNITY
Start: 2022-03-29

## 2022-06-29 RX ORDER — DICLOFENAC EPOLAMINE 0.01 G/1
1.3 SYSTEM TOPICAL TWICE DAILY
Qty: 90 | Refills: 1 | Status: COMPLETED | COMMUNITY
Start: 2021-10-27 | End: 2022-06-29

## 2022-06-29 RX ORDER — TRIAMCINOLONE ACETONIDE 1 MG/G
0.1 OINTMENT TOPICAL
Qty: 454 | Refills: 0 | Status: COMPLETED | COMMUNITY
Start: 2022-06-06

## 2022-06-29 RX ORDER — NADOLOL 20 MG/1
20 TABLET ORAL
Qty: 30 | Refills: 0 | Status: ACTIVE | COMMUNITY
Start: 2022-05-19

## 2022-06-29 RX ORDER — AZITHROMYCIN 500 MG/1
500 TABLET, FILM COATED ORAL
Qty: 3 | Refills: 0 | Status: COMPLETED | COMMUNITY
Start: 2022-03-29

## 2022-06-29 RX ORDER — OMEPRAZOLE 40 MG/1
40 CAPSULE, DELAYED RELEASE ORAL
Qty: 30 | Refills: 0 | Status: COMPLETED | COMMUNITY
Start: 2022-05-19

## 2022-06-29 RX ORDER — LANSOPRAZOLE 30 MG/1
30 CAPSULE, DELAYED RELEASE ORAL
Qty: 30 | Refills: 0 | Status: COMPLETED | COMMUNITY
Start: 2022-05-13

## 2022-06-29 RX ORDER — HYDROCORTISONE 25 MG/G
2.5 OINTMENT TOPICAL
Qty: 28 | Refills: 0 | Status: ACTIVE | COMMUNITY
Start: 2022-06-02

## 2022-06-29 RX ORDER — LOPERAMIDE HYDROCHLORIDE 2 MG/1
2 CAPSULE ORAL
Qty: 30 | Refills: 0 | Status: COMPLETED | COMMUNITY
Start: 2022-05-13

## 2022-06-29 RX ORDER — IPRATROPIUM BROMIDE 21 UG/1
0.03 SPRAY NASAL 3 TIMES DAILY
Qty: 3 | Refills: 1 | Status: COMPLETED | COMMUNITY
Start: 2019-04-03 | End: 2022-06-29

## 2022-06-29 RX ORDER — HYDROXYZINE HYDROCHLORIDE 10 MG/1
10 TABLET ORAL
Qty: 30 | Refills: 0 | Status: ACTIVE | COMMUNITY
Start: 2022-06-02

## 2022-06-29 RX ORDER — FLUTICASONE PROPIONATE 50 UG/1
50 SPRAY, METERED NASAL DAILY
Qty: 1 | Refills: 6 | Status: ACTIVE | COMMUNITY
Start: 2022-06-29 | End: 1900-01-01

## 2022-06-29 RX ORDER — ONDANSETRON 4 MG/1
4 TABLET, ORALLY DISINTEGRATING ORAL
Qty: 10 | Refills: 0 | Status: COMPLETED | COMMUNITY
Start: 2022-05-13

## 2022-06-29 NOTE — HEALTH RISK ASSESSMENT
[Patient reported colonoscopy was normal] : Patient reported colonoscopy was normal [ColonoscopyDate] : 1/2020

## 2022-06-29 NOTE — ASSESSMENT
[FreeTextEntry1] : a. overall stable form her wtc conditions. conditions are active. need to evaluate difficulty swallowing reporteadly told by gi md that is due to reflux. \par p. meds renewed, unchanged. will keep mostly local meds due to cirrhosis.continue gi md and nephrol. will see her in some 4 mo, pending issue of disphagia. had colonosocy in feb 2020. referred for thyroid u/s, cxr and citolgoy. repotedly had mammo through md this year, normal. no c4, case is closed. pt is totally disabled due to multiple medical conditions. rtc in 4 mo. will give phentermine as per pt's request as this was recommended by marilyn sparks for lossing weight, discussed progressive tapering of this. \par \par Confidential Drug Utilization Report\par Search Terms: ean harris, 1965 \par Search Date: 06/29/2022 12:01:46 PM \par The Drug Utilization Report below displays all of the controlled substance prescriptions, if any, that your patient has filled in the last twelve months. The information displayed on this report is compiled from pharmacy submissions to the Department, and accurately reflects the information as submitted by the pharmacies.\par This report was requested by: Murali Campoverde | Reference #: [id number] \par \par  Others' Prescriptions\par Patient Name: Ean Harris \par YOB: 1965 \par Address: [address] [place] [address] \par Sex: Female \par \par Rx Written\par Rx Dispensed\par Drug\par Quantity\par Days Supply\par Prescriber Name\par Prescriber Dulce #\par Payment Method\par Dispenser\par 06/11/2022 06/11/2022 phentermine 37.5 mg tablet  30 30 Ruy Hogue MD [id number] [facility] \par \par Patient Name: Ean Harris \par YOB: 1965 \par Address: [address] [place] [address] \par Sex: Female \par \par Rx Written\par Rx Dispensed\par Drug\par Quantity\par Days Supply\par Prescriber Name\par Prescriber Dulce #\par Payment Method\par Dispenser\par \par 11/22/2021 11/29/2021 lorazepam 1 mg tablet  30 30 [facility] [id number] [employer] [facility] \par

## 2022-06-29 NOTE — HISTORY OF PRESENT ILLNESS
[FreeTextEntry1] : certified for rhinitis, laryngitis, copd, gerd, cts, cervical and lumbar radiculopathy\par visit conducted in Greek\par arrives some 30 min late. last seen oct 2021\par s. patient has been overall stable, on med follow up for cirrhosis. \par from the [facility] conditions continues to report some frequent need to clear her throat, cough occasional, difficulty swallowing, heartburn occasional and epigastric pain. continues with occasional pain and cramping in her back and lower extremities, local diclofenac helps for this. \par was seen by gi md, got meds to lose weight as per md recommendation. this has helped. \par was also seen by uro and currently nephlogist due to frequent  and repeated kidney stones, reportedly composed of calcium. \par continues with closing of her left eyelid, that occurs suddenly and is worsening. reportedly was told that this is due to local muscle damage, recommended bottox which helped but only for some 6 mo. \par patient got ss. 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

## 2022-06-29 NOTE — DISCUSSION/SUMMARY
[Patient seen for WTC Monitoring ___] : Patient was seen for WTC monitoring [unfilled] [Please See Note in Chart and Documentation in Trial DB] : Please see note in chart and documentation in Trial DB. [FreeTextEntry3] : certified for rhinitis, laryngitis, copd, gerd, cts, cervical and lumbar radiculopathy\par visit conducted in Polish\par arrives some 30 min late. last seen oct 2021\par s. patient has been overall stable, on med follow up for cirrhosis. \par from the [facility] conditions continues to report some frequent need to clear her throat, cough occasional, difficulty swallowing, heartburn occasional and epigastric pain. continues with occasional pain and cramping in her back and lower extremities, local diclofenac helps for this. \par was seen by gi md, got meds to lose weight as per md recommendation. this has helped. \par was also seen by uro and currently nephlogist due to frequent and repeated kidney stones, reportedly composed of calcium. \par continues with closing of her left eyelid, that occurs suddenly and is worsening. reportedly was told that this is due to local muscle damage, recommended bottox which helped but only for some 6 mo. \par patient got ss. patient is not working\par Physical Exam\par Constitutional: alert, in no acute distress, well nourished and well developed. \par Eyes: the sclera and conjunctiva were normal, pupils were equal in size, round, and reactive to light and extraocular movements were intact . spontaneous twitching in her r eyelid, which is constantly half-closed due to ptosis. \par ENT: the ears and nose were normal in appearance. Tenderness at maxillary sinuses. oropharynx: overal clear, no secretions. \par Neck: the appearance of the neck was normal, the neck was supple, no neck mass was observed and the thyroid was not enlarged . there was no jugular-venous distention. \par Pulmonary: no respiratory distress, normal respiratory rhythm and effort, no accessory muscle use, lungs were clear to auscultation bilaterally and palpation of the chest revealed no abnormalities. \par Heart: the apical impulse was normal, heart rate was normal and rhythm regular, normal S1 and S2, no gallops and no murmurs. \par Vascular:. there was no peripheral edema. \par Abdomen: normal bowel sounds, soft, no hepato-splenomegaly and no abdominal mass palpated . tender at epigastrium no rebound. \par Lymphatics: The posterior cervical, anterior cervical, supraclavicular and axillary nodes were non-tender and normal size. \par a and p. documentation completed.

## 2022-06-29 NOTE — ASSESSMENT
[FreeTextEntry1] : a. overall stable form her wtc conditions. conditions are active. need to evaluate difficulty swallowing reporteadly told by gi md that is due to reflux. \par p. meds renewed, unchanged. will keep mostly local meds due to cirrhosis.continue gi md and nephrol. will see her in some 4 mo, pending issue of disphagia. had colonosocy in feb 2020. referred for thyroid u/s, cxr and citolgoy. repotedly had mammo through md this year, normal. no c4, case is closed. pt is totally disabled due to multiple medical conditions. rtc in 4 mo. will give phentermine as per pt's request as this was recommended by marilyn sparks for lossing weight, discussed progressive tapering of this. \par \par Confidential Drug Utilization Report\par Search Terms: ean harris, 1965 \par Search Date: 06/29/2022 12:01:46 PM \par The Drug Utilization Report below displays all of the controlled substance prescriptions, if any, that your patient has filled in the last twelve months. The information displayed on this report is compiled from pharmacy submissions to the Department, and accurately reflects the information as submitted by the pharmacies.\par This report was requested by: Murali Campoverde | Reference #: [id number] \par \par  Others' Prescriptions\par Patient Name: Ean Harris \par YOB: 1965 \par Address: [address] [place] [address] \par Sex: Female \par \par Rx Written\par Rx Dispensed\par Drug\par Quantity\par Days Supply\par Prescriber Name\par Prescriber Dulce #\par Payment Method\par Dispenser\par 06/11/2022 06/11/2022 phentermine 37.5 mg tablet  30 30 Ruy Hogue MD [id number] [facility] \par \par Patient Name: Ean Harris \par YOB: 1965 \par Address: [address] [place] [address] \par Sex: Female \par \par Rx Written\par Rx Dispensed\par Drug\par Quantity\par Days Supply\par Prescriber Name\par Prescriber Dulce #\par Payment Method\par Dispenser\par \par 11/22/2021 11/29/2021 lorazepam 1 mg tablet  30 30 [facility] [id number] [employer]  [facility] \par

## 2022-06-29 NOTE — DISCUSSION/SUMMARY
[Patient seen for WTC Monitoring ___] : Patient was seen for WTC monitoring [unfilled] [Please See Note in Chart and Documentation in Trial DB] : Please see note in chart and documentation in Trial DB. [FreeTextEntry3] : certified for rhinitis, laryngitis, copd, gerd, cts, cervical and lumbar radiculopathy\par visit conducted in Romansh\par arrives some 30 min late. last seen oct 2021\par s. patient has been overall stable, on med follow up for cirrhosis. \par from the [facility] conditions continues to report some frequent need to clear her throat, cough occasional, difficulty swallowing, heartburn occasional and epigastric pain. continues with occasional pain and cramping in her back and lower extremities, local diclofenac helps for this. \par was seen by gi md, got meds to lose weight as per md recommendation. this has helped. \par was also seen by uro and currently nephlogist due to frequent and repeated kidney stones, reportedly composed of calcium. \par continues with closing of her left eyelid, that occurs suddenly and is worsening. reportedly was told that this is due to local muscle damage, recommended bottox which helped but only for some 6 mo. \par patient got ss. patient is not working\par Physical Exam\par Constitutional: alert, in no acute distress, well nourished and well developed. \par Eyes: the sclera and conjunctiva were normal, pupils were equal in size, round, and reactive to light and extraocular movements were intact . spontaneous twitching in her r eyelid, which is constantly half-closed due to ptosis. \par ENT: the ears and nose were normal in appearance. Tenderness at maxillary sinuses. oropharynx: overal clear, no secretions. \par Neck: the appearance of the neck was normal, the neck was supple, no neck mass was observed and the thyroid was not enlarged . there was no jugular-venous distention. \par Pulmonary: no respiratory distress, normal respiratory rhythm and effort, no accessory muscle use, lungs were clear to auscultation bilaterally and palpation of the chest revealed no abnormalities. \par Heart: the apical impulse was normal, heart rate was normal and rhythm regular, normal S1 and S2, no gallops and no murmurs. \par Vascular:. there was no peripheral edema. \par Abdomen: normal bowel sounds, soft, no hepato-splenomegaly and no abdominal mass palpated . tender at epigastrium no rebound. \par Lymphatics: The posterior cervical, anterior cervical, supraclavicular and axillary nodes were non-tender and normal size. \par a and p. documentation completed.

## 2022-06-29 NOTE — PHYSICAL EXAM
[General Appearance - Alert] : alert [General Appearance - In No Acute Distress] : in no acute distress [General Appearance - Well Nourished] : well nourished [General Appearance - Well Developed] : well developed [Sclera] : the sclera and conjunctiva were normal [PERRL With Normal Accommodation] : pupils were equal in size, round, and reactive to light [Extraocular Movements] : extraocular movements were intact [Outer Ear] : the ears and nose were normal in appearance [Neck Appearance] : the appearance of the neck was normal [Neck Cervical Mass (___cm)] : no neck mass was observed [Jugular Venous Distention Increased] : there was no jugular-venous distention [Thyroid Diffuse Enlargement] : the thyroid was not enlarged [Respiration, Rhythm And Depth] : normal respiratory rhythm and effort [Exaggerated Use Of Accessory Muscles For Inspiration] : no accessory muscle use [Auscultation Breath Sounds / Voice Sounds] : lungs were clear to auscultation bilaterally [Chest Palpation] : palpation of the chest revealed no abnormalities [Apical Impulse] : the apical impulse was normal [Heart Rate And Rhythm] : heart rate was normal and rhythm regular [Heart Sounds] : normal S1 and S2 [Heart Sounds Gallop] : no gallops [Murmurs] : no murmurs [Edema] : there was no peripheral edema [Bowel Sounds] : normal bowel sounds [Abdomen Soft] : soft [] : no hepato-splenomegaly [Abdomen Mass (___ Cm)] : no abdominal mass palpated [Cervical Lymph Nodes Enlarged Posterior Bilaterally] : posterior cervical [Cervical Lymph Nodes Enlarged Anterior Bilaterally] : anterior cervical [Supraclavicular Lymph Nodes Enlarged Bilaterally] : supraclavicular [Axillary Lymph Nodes Enlarged Bilaterally] : axillary [FreeTextEntry1] : patient has a tic on the right side of her face.

## 2022-06-29 NOTE — HISTORY OF PRESENT ILLNESS
[FreeTextEntry1] : certified for rhinitis, laryngitis, copd, gerd, cts, cervical and lumbar radiculopathy\par visit conducted in Romansh\par arrives some 30 min late. last seen oct 2021\par s. patient has been overall stable, on med follow up for cirrhosis. \par from the [facility] conditions continues to report some frequent need to clear her throat, cough occasional, difficulty swallowing, heartburn occasional and epigastric pain. continues with occasional pain and cramping in her back and lower extremities, local diclofenac helps for this. \par was seen by gi md, got meds to lose weight as per md recommendation. this has helped. \par was also seen by uro and currently nephlogist due to frequent  and repeated kidney stones, reportedly composed of calcium. \par continues with closing of her left eyelid, that occurs suddenly and is worsening. reportedly was told that this is due to local muscle damage, recommended bottox which helped but only for some 6 mo. \par patient got ss. 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

## 2022-06-30 LAB
ALBUMIN SERPL ELPH-MCNC: 3.9 G/DL
ALP BLD-CCNC: 193 U/L
ALT SERPL-CCNC: 35 U/L
ANION GAP SERPL CALC-SCNC: 10 MMOL/L
APPEARANCE: CLEAR
AST SERPL-CCNC: 65 U/L
BASOPHILS # BLD AUTO: 0.09 K/UL
BASOPHILS NFR BLD AUTO: 1.5 %
BILIRUB SERPL-MCNC: 1.2 MG/DL
BILIRUBIN URINE: NEGATIVE
BLOOD URINE: ABNORMAL
BUN SERPL-MCNC: 7 MG/DL
CALCIUM SERPL-MCNC: 9.8 MG/DL
CHLORIDE SERPL-SCNC: 104 MMOL/L
CHOLEST SERPL-MCNC: 159 MG/DL
CO2 SERPL-SCNC: 22 MMOL/L
COLOR: COLORLESS
CREAT SERPL-MCNC: 0.79 MG/DL
EGFR: 87 ML/MIN/1.73M2
EOSINOPHIL # BLD AUTO: 0.22 K/UL
EOSINOPHIL NFR BLD AUTO: 3.8 %
GLUCOSE QUALITATIVE U: NEGATIVE
GLUCOSE SERPL-MCNC: 89 MG/DL
HCT VFR BLD CALC: 41.5 %
HDLC SERPL-MCNC: 47 MG/DL
HGB BLD-MCNC: 13.4 G/DL
IMM GRANULOCYTES NFR BLD AUTO: 0.3 %
KETONES URINE: NEGATIVE
LDLC SERPL CALC-MCNC: 96 MG/DL
LEUKOCYTE ESTERASE URINE: NEGATIVE
LYMPHOCYTES # BLD AUTO: 2.87 K/UL
LYMPHOCYTES NFR BLD AUTO: 49.2 %
MAN DIFF?: NORMAL
MCHC RBC-ENTMCNC: 30.9 PG
MCHC RBC-ENTMCNC: 32.3 GM/DL
MCV RBC AUTO: 95.8 FL
MONOCYTES # BLD AUTO: 0.57 K/UL
MONOCYTES NFR BLD AUTO: 9.8 %
NEUTROPHILS # BLD AUTO: 2.06 K/UL
NEUTROPHILS NFR BLD AUTO: 35.4 %
NITRITE URINE: NEGATIVE
NONHDLC SERPL-MCNC: 112 MG/DL
PH URINE: 7.5
PLATELET # BLD AUTO: 111 K/UL
POTASSIUM SERPL-SCNC: 4.2 MMOL/L
PROT SERPL-MCNC: 6.9 G/DL
PROTEIN URINE: NEGATIVE
RBC # BLD: 4.33 M/UL
RBC # FLD: 15.1 %
SODIUM SERPL-SCNC: 136 MMOL/L
SPECIFIC GRAVITY URINE: 1
TRIGL SERPL-MCNC: 78 MG/DL
UROBILINOGEN URINE: NORMAL
WBC # FLD AUTO: 5.83 K/UL

## 2022-07-06 ENCOUNTER — NON-APPOINTMENT (OUTPATIENT)
Age: 57
End: 2022-07-06

## 2022-07-27 ENCOUNTER — APPOINTMENT (OUTPATIENT)
Dept: OBGYN | Facility: CLINIC | Age: 57
End: 2022-07-27

## 2022-10-26 ENCOUNTER — NON-APPOINTMENT (OUTPATIENT)
Age: 57
End: 2022-10-26

## 2022-11-02 ENCOUNTER — APPOINTMENT (OUTPATIENT)
Dept: OTHER | Facility: CLINIC | Age: 57
End: 2022-11-02

## 2023-02-01 ENCOUNTER — APPOINTMENT (OUTPATIENT)
Dept: OTHER | Facility: CLINIC | Age: 58
End: 2023-02-01

## 2023-05-31 ENCOUNTER — APPOINTMENT (OUTPATIENT)
Dept: OTHER | Facility: CLINIC | Age: 58
End: 2023-05-31

## 2023-10-26 ENCOUNTER — NON-APPOINTMENT (OUTPATIENT)
Age: 58
End: 2023-10-26

## 2023-11-22 ENCOUNTER — APPOINTMENT (OUTPATIENT)
Dept: OTHER | Facility: CLINIC | Age: 58
End: 2023-11-22

## 2024-04-03 ENCOUNTER — APPOINTMENT (OUTPATIENT)
Dept: OTHER | Facility: CLINIC | Age: 59
End: 2024-04-03
Payer: COMMERCIAL

## 2024-04-03 ENCOUNTER — LABORATORY RESULT (OUTPATIENT)
Age: 59
End: 2024-04-03

## 2024-04-03 VITALS
DIASTOLIC BLOOD PRESSURE: 77 MMHG | TEMPERATURE: 97.2 F | BODY MASS INDEX: 28.97 KG/M2 | WEIGHT: 138 LBS | SYSTOLIC BLOOD PRESSURE: 124 MMHG | RESPIRATION RATE: 18 BRPM | HEART RATE: 70 BPM | HEIGHT: 58 IN | OXYGEN SATURATION: 97 %

## 2024-04-03 DIAGNOSIS — M50.10 CERVICAL DISC DISORDER WITH RADICULOPATHY, UNSPECIFIED CERVICAL REGION: ICD-10-CM

## 2024-04-03 DIAGNOSIS — J37.0 CHRONIC LARYNGITIS: ICD-10-CM

## 2024-04-03 DIAGNOSIS — Z04.9 ENCOUNTER FOR EXAMINATION AND OBSERVATION FOR UNSPECIFIED REASON: ICD-10-CM

## 2024-04-03 DIAGNOSIS — M54.16 RADICULOPATHY, LUMBAR REGION: ICD-10-CM

## 2024-04-03 DIAGNOSIS — J31.1 CHRONIC NASOPHARYNGITIS: ICD-10-CM

## 2024-04-03 DIAGNOSIS — Z12.9 ENCOUNTER FOR SCREENING FOR MALIGNANT NEOPLASM, SITE UNSPECIFIED: ICD-10-CM

## 2024-04-03 PROCEDURE — 99396 PREV VISIT EST AGE 40-64: CPT | Mod: 25

## 2024-04-03 PROCEDURE — 99214 OFFICE O/P EST MOD 30 MIN: CPT | Mod: 25

## 2024-04-03 PROCEDURE — 94010 BREATHING CAPACITY TEST: CPT

## 2024-04-03 RX ORDER — DICLOFENAC SODIUM 1% 10 MG/G
1 GEL TOPICAL
Qty: 1 | Refills: 6 | Status: ACTIVE | COMMUNITY
Start: 2022-06-29 | End: 1900-01-01

## 2024-04-03 RX ORDER — ACETAMINOPHEN 160 MG/5ML
0.65 ELIXIR ORAL 4 TIMES DAILY
Qty: 1 | Refills: 6 | Status: ACTIVE | COMMUNITY
Start: 2018-06-27 | End: 1900-01-01

## 2024-04-03 RX ORDER — ALBUTEROL SULFATE 90 UG/1
108 (90 BASE) AEROSOL, METERED RESPIRATORY (INHALATION)
Qty: 1 | Refills: 6 | Status: ACTIVE | COMMUNITY
Start: 2018-01-03 | End: 1900-01-01

## 2024-04-03 RX ORDER — ESOMEPRAZOLE MAGNESIUM 40 MG/1
40 CAPSULE, DELAYED RELEASE ORAL DAILY
Qty: 1 | Refills: 6 | Status: ACTIVE | COMMUNITY
Start: 2021-06-30 | End: 1900-01-01

## 2024-04-03 RX ORDER — PHENTERMINE HYDROCHLORIDE 30 MG/1
30 CAPSULE ORAL DAILY
Qty: 30 | Refills: 0 | Status: COMPLETED | COMMUNITY
Start: 2022-06-29 | End: 2024-04-03

## 2024-04-03 RX ORDER — NAPROXEN 375 MG/1
375 TABLET ORAL
Qty: 40 | Refills: 6 | Status: COMPLETED | COMMUNITY
Start: 2021-06-30 | End: 2024-04-03

## 2024-04-03 RX ORDER — OLOPATADINE HCL 1 MG/ML
0.1 SOLUTION/ DROPS OPHTHALMIC TWICE DAILY
Qty: 1 | Refills: 6 | Status: ACTIVE | COMMUNITY
Start: 2018-01-03 | End: 1900-01-01

## 2024-04-03 NOTE — ASSESSMENT
[FreeTextEntry1] : afsaneh today; mod restriction, loss of 95 ml?yr fev1 since 2013.  a. multiple medical problems. overall stable form her wtc resp and gi conditions, musculoskeletal conditionis are active and not fully clarified. conditions are active.  p. meds renewed, unchanged. will keep mostly local meds due to cirrhosis. continue gi md. will refer to neuro, ? emg/ncv. will see her in some 4 mo. will repeat neck u/s and cxr today, may need cxr. pt also reports h/o sleep apnea but no documentation noted, may repeat sleep study at a future visit. had colonosocy in feb 2020. no c4, case is closed. pt is totally disabled due to multiple medical conditions. rtc in 4 mo. life insurance form completed.

## 2024-04-03 NOTE — PHYSICAL EXAM
[General Appearance - Alert] : alert [General Appearance - In No Acute Distress] : in no acute distress [General Appearance - Well Nourished] : well nourished [General Appearance - Well Developed] : well developed [Sclera] : the sclera and conjunctiva were normal [Extraocular Movements] : extraocular movements were intact [PERRL With Normal Accommodation] : pupils were equal in size, round, and reactive to light [Outer Ear] : the ears and nose were normal in appearance [Neck Appearance] : the appearance of the neck was normal [Neck Cervical Mass (___cm)] : no neck mass was observed [Jugular Venous Distention Increased] : there was no jugular-venous distention [Thyroid Diffuse Enlargement] : the thyroid was not enlarged [Respiration, Rhythm And Depth] : normal respiratory rhythm and effort [Exaggerated Use Of Accessory Muscles For Inspiration] : no accessory muscle use [Chest Palpation] : palpation of the chest revealed no abnormalities [FreeTextEntry1] : rales at b bases, more right. did not clear with cough.  [Apical Impulse] : the apical impulse was normal [Heart Rate And Rhythm] : heart rate was normal and rhythm regular [Heart Sounds] : normal S1 and S2 [Murmurs] : no murmurs [Heart Sounds Gallop] : no gallops [Edema] : there was no peripheral edema [Bowel Sounds] : normal bowel sounds [Abdomen Soft] : soft [Abdomen Tenderness] : non-tender [Abdomen Mass (___ Cm)] : no abdominal mass palpated [] : no hepato-splenomegaly [Cervical Lymph Nodes Enlarged Posterior Bilaterally] : posterior cervical [Supraclavicular Lymph Nodes Enlarged Bilaterally] : supraclavicular [Cervical Lymph Nodes Enlarged Anterior Bilaterally] : anterior cervical [Axillary Lymph Nodes Enlarged Bilaterally] : axillary

## 2024-04-03 NOTE — HISTORY OF PRESENT ILLNESS
[FreeTextEntry1] : certified for rhinitis, laryngitis, copd, gerd, cts, cervical and lumbar radiculopathy visit conducted in Swedish arrives some 30 min late. last seen June 2022 s. patient has had several complications to her general health. apparently had acute anemia some 1 year ago, is under hepatology and hematology care. her mother passed a few months ago.  from the [facility] conditions continues to report some frequent need to clear her throat, cough occasional, difficulty swallowing, heartburn occasional and epigastric pain. continues with occasional pain and cramping in her back and lower extremities, adn reports cramping of her hands.  uses albuterol prn. uses esomeprazole prn for heartburn, occasional. uses eye drops as well and local diclofenac.  continues with closing of her left eyelid, that occurs suddenly and is worsening. reportedly was told that this is due to local muscle damage, patient got ss. 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

## 2024-04-03 NOTE — DISCUSSION/SUMMARY
[Please See Note in Chart and Documentation in Trial DB] : Please see note in chart and documentation in Trial DB. [Patient seen for WTC Monitoring ___] : Patient was seen for WTC monitoring [unfilled] [FreeTextEntry3] : certified for rhinitis, laryngitis, copd, gerd, cts, cervical and lumbar radiculopathy visit conducted in Romanian arrives some 30 min late. last seen June 2022 s. patient has had several complications to her general health. apparently had acute anemia some 1 year ago, is under hepatology and hematology care. her mother passed a few months ago. from the [facility] conditions continues to report some frequent need to clear her throat, cough occasional, difficulty swallowing, heartburn occasional and epigastric pain. continues with occasional pain and cramping in her back and lower extremities, adn reports cramping of her hands. uses albuterol prn. uses esomeprazole prn for heartburn, occasional. uses eye drops as well and local diclofenac. continues with closing of her left eyelid, that occurs suddenly and is worsening. reportedly was told that this is due to local muscle damage, patient got ss. 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 Physical Exam     Constitutional: alert, in no acute distress, well nourished and well developed . pt acts and speaks slow, but hse is oriented x 3. Eyes: the sclera and conjunctiva were normal, pupils were equal in size, round, and reactive to light and extraocular movements were intact . spontaneous twitching in her r eyelid, which is constantly half-closed due to ptosis. ENT: the ears and nose were normal in appearance. No tender at sinuses. oropharynx: overall clear, no secretions. Neck: the appearance of the neck was normal, the neck was supple, no neck mass was observed and the thyroid was not enlarged . there was no jugular-venous distention. Pulmonary: no respiratory distress, normal respiratory rhythm and effort, no accessory muscle use and palpation of the chest revealed no abnormalities . rales at b bases, more right. did not clear with cough. Heart: the apical impulse was normal, heart rate was normal and rhythm regular, normal S1 and S2, no gallops and no murmurs. Vascular:. there was no peripheral edema. Abdomen: normal bowel sounds, soft, non-tender, no hepato-splenomegaly and no abdominal mass palpated. Lymphatics: The posterior cervical, anterior cervical, supraclavicular and axillary nodes were non-tender and normal size. a and p. documentation completed.

## 2024-04-03 NOTE — DISCUSSION/SUMMARY
[Patient seen for WTC Monitoring ___] : Patient was seen for WTC monitoring [unfilled] [Please See Note in Chart and Documentation in Trial DB] : Please see note in chart and documentation in Trial DB. [FreeTextEntry3] : certified for rhinitis, laryngitis, copd, gerd, cts, cervical and lumbar radiculopathy visit conducted in [place]ak[place] arrives some 30 min late. last seen June 2022 s. patient has had several complications to her general health. apparently had acute anemia some 1 year ago, is under hepatology and hematology care. her mother passed a few months ago. from the [facility] conditions continues to report some frequent need to clear her throat, cough occasional, difficulty swallowing, heartburn occasional and epigastric pain. continues with occasional pain and cramping in her back and lower extremities, adn reports cramping of her hands. uses albuterol prn. uses esomeprazole prn for heartburn, occasional. uses eye drops as well and local diclofenac. continues with closing of her left eyelid, that occurs suddenly and is worsening. reportedly was told that this is due to local muscle damage, patient got ss. 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 Physical Exam     Constitutional: alert, in no acute distress, well nourished and well developed . pt acts and speaks slow, but hse is oriented x 3. Eyes: the sclera and conjunctiva were normal, pupils were equal in size, round, and reactive to light and extraocular movements were intact . spontaneous twitching in her r eyelid, which is constantly half-closed due to ptosis. ENT: the ears and nose were normal in appearance. No tender at sinuses. oropharynx: overall clear, no secretions. Neck: the appearance of the neck was normal, the neck was supple, no neck mass was observed and the thyroid was not enlarged . there was no jugular-venous distention. Pulmonary: no respiratory distress, normal respiratory rhythm and effort, no accessory muscle use and palpation of the chest revealed no abnormalities . rales at b bases, more right. did not clear with cough. Heart: the apical impulse was normal, heart rate was normal and rhythm regular, normal S1 and S2, no gallops and no murmurs. Vascular:. there was no peripheral edema. Abdomen: normal bowel sounds, soft, non-tender, no hepato-splenomegaly and no abdominal mass palpated. Lymphatics: The posterior cervical, anterior cervical, supraclavicular and axillary nodes were non-tender and normal size. a and p. documentation completed.

## 2024-04-03 NOTE — PHYSICAL EXAM
[General Appearance - Alert] : alert [General Appearance - In No Acute Distress] : in no acute distress [General Appearance - Well Nourished] : well nourished [General Appearance - Well Developed] : well developed [Sclera] : the sclera and conjunctiva were normal [Extraocular Movements] : extraocular movements were intact [PERRL With Normal Accommodation] : pupils were equal in size, round, and reactive to light [Outer Ear] : the ears and nose were normal in appearance [Neck Appearance] : the appearance of the neck was normal [Neck Cervical Mass (___cm)] : no neck mass was observed [Jugular Venous Distention Increased] : there was no jugular-venous distention [Thyroid Diffuse Enlargement] : the thyroid was not enlarged [Respiration, Rhythm And Depth] : normal respiratory rhythm and effort [Exaggerated Use Of Accessory Muscles For Inspiration] : no accessory muscle use [Chest Palpation] : palpation of the chest revealed no abnormalities [FreeTextEntry1] : rales at b bases, more right. did not clear with cough.  [Apical Impulse] : the apical impulse was normal [Heart Rate And Rhythm] : heart rate was normal and rhythm regular [Heart Sounds] : normal S1 and S2 [Murmurs] : no murmurs [Heart Sounds Gallop] : no gallops [Edema] : there was no peripheral edema [Bowel Sounds] : normal bowel sounds [Abdomen Soft] : soft [Abdomen Tenderness] : non-tender [Abdomen Mass (___ Cm)] : no abdominal mass palpated [] : no hepato-splenomegaly [Cervical Lymph Nodes Enlarged Posterior Bilaterally] : posterior cervical [Cervical Lymph Nodes Enlarged Anterior Bilaterally] : anterior cervical [Supraclavicular Lymph Nodes Enlarged Bilaterally] : supraclavicular [Axillary Lymph Nodes Enlarged Bilaterally] : axillary

## 2024-04-03 NOTE — HISTORY OF PRESENT ILLNESS
[FreeTextEntry1] : certified for rhinitis, laryngitis, copd, gerd, cts, cervical and lumbar radiculopathy visit conducted in [place] arrives some 30 min late. last seen June 2022 s. patient has had several complications to her general health. apparently had acute anemia some 1 year ago, is under hepatology and hematology care. her mother passed a few months ago.  from the [facility] conditions continues to report some frequent need to clear her throat, cough occasional, difficulty swallowing, heartburn occasional and epigastric pain. continues with occasional pain and cramping in her back and lower extremities, adn reports cramping of her hands.  uses albuterol prn. uses esomeprazole prn for heartburn, occasional. uses eye drops as well and local diclofenac.  continues with closing of her left eyelid, that occurs suddenly and is worsening. reportedly was told that this is due to local muscle damage, patient got ss. 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

## 2024-04-04 LAB
ALBUMIN SERPL ELPH-MCNC: 3.7 G/DL
ALP BLD-CCNC: 257 U/L
ALT SERPL-CCNC: 32 U/L
ANION GAP SERPL CALC-SCNC: 13 MMOL/L
APPEARANCE: CLEAR
AST SERPL-CCNC: 52 U/L
BASOPHILS # BLD AUTO: 0.05 K/UL
BASOPHILS NFR BLD AUTO: 1.2 %
BILIRUB SERPL-MCNC: 1.3 MG/DL
BILIRUBIN URINE: NEGATIVE
BLOOD URINE: ABNORMAL
BUN SERPL-MCNC: 8 MG/DL
CALCIUM SERPL-MCNC: 9.8 MG/DL
CHLORIDE SERPL-SCNC: 105 MMOL/L
CHOLEST SERPL-MCNC: 135 MG/DL
CO2 SERPL-SCNC: 19 MMOL/L
COLOR: YELLOW
CREAT SERPL-MCNC: 0.69 MG/DL
EGFR: 99 ML/MIN/1.73M2
EOSINOPHIL # BLD AUTO: 0.15 K/UL
EOSINOPHIL NFR BLD AUTO: 3.7 %
GLUCOSE QUALITATIVE U: NEGATIVE MG/DL
GLUCOSE SERPL-MCNC: 90 MG/DL
HCT VFR BLD CALC: 34.7 %
HDLC SERPL-MCNC: 60 MG/DL
HGB BLD-MCNC: 11.1 G/DL
IMM GRANULOCYTES NFR BLD AUTO: 0.2 %
KETONES URINE: NEGATIVE MG/DL
LDLC SERPL CALC-MCNC: 62 MG/DL
LEUKOCYTE ESTERASE URINE: ABNORMAL
LYMPHOCYTES # BLD AUTO: 1.55 K/UL
LYMPHOCYTES NFR BLD AUTO: 38.6 %
MAN DIFF?: NORMAL
MCHC RBC-ENTMCNC: 28.5 PG
MCHC RBC-ENTMCNC: 32 GM/DL
MCV RBC AUTO: 89 FL
MONOCYTES # BLD AUTO: 0.43 K/UL
MONOCYTES NFR BLD AUTO: 10.7 %
NEUTROPHILS # BLD AUTO: 1.83 K/UL
NEUTROPHILS NFR BLD AUTO: 45.6 %
NITRITE URINE: NEGATIVE
NONHDLC SERPL-MCNC: 75 MG/DL
PH URINE: 7.5
PLATELET # BLD AUTO: 102 K/UL
POTASSIUM SERPL-SCNC: 3.6 MMOL/L
PROT SERPL-MCNC: 7.3 G/DL
PROTEIN URINE: NEGATIVE MG/DL
RBC # BLD: 3.9 M/UL
RBC # FLD: 19.4 %
SODIUM SERPL-SCNC: 137 MMOL/L
SPECIFIC GRAVITY URINE: 1
TRIGL SERPL-MCNC: 65 MG/DL
UROBILINOGEN URINE: 0.2 MG/DL
WBC # FLD AUTO: 4.02 K/UL

## 2024-04-05 ENCOUNTER — NON-APPOINTMENT (OUTPATIENT)
Age: 59
End: 2024-04-05

## 2024-04-05 DIAGNOSIS — J44.9 CHRONIC OBSTRUCTIVE PULMONARY DISEASE, UNSPECIFIED: ICD-10-CM

## 2024-04-08 ENCOUNTER — APPOINTMENT (OUTPATIENT)
Dept: CT IMAGING | Facility: CLINIC | Age: 59
End: 2024-04-08
Payer: COMMERCIAL

## 2024-04-08 PROCEDURE — 71250 CT THORAX DX C-: CPT

## 2024-04-10 ENCOUNTER — NON-APPOINTMENT (OUTPATIENT)
Age: 59
End: 2024-04-10

## 2024-04-16 ENCOUNTER — NON-APPOINTMENT (OUTPATIENT)
Age: 59
End: 2024-04-16

## 2024-05-22 ENCOUNTER — APPOINTMENT (OUTPATIENT)
Dept: OTHER | Facility: CLINIC | Age: 59
End: 2024-05-22

## 2024-06-26 ENCOUNTER — APPOINTMENT (OUTPATIENT)
Dept: NEUROLOGY | Facility: CLINIC | Age: 59
End: 2024-06-26

## 2024-08-14 ENCOUNTER — APPOINTMENT (OUTPATIENT)
Dept: OTHER | Facility: CLINIC | Age: 59
End: 2024-08-14

## 2024-11-23 ENCOUNTER — NON-APPOINTMENT (OUTPATIENT)
Age: 59
End: 2024-11-23

## 2025-04-02 ENCOUNTER — APPOINTMENT (OUTPATIENT)
Dept: OTHER | Facility: CLINIC | Age: 60
End: 2025-04-02

## 2025-07-06 ENCOUNTER — NON-APPOINTMENT (OUTPATIENT)
Age: 60
End: 2025-07-06

## 2025-08-13 ENCOUNTER — APPOINTMENT (OUTPATIENT)
Dept: OTHER | Facility: CLINIC | Age: 60
End: 2025-08-13
Payer: COMMERCIAL

## 2025-08-13 VITALS
OXYGEN SATURATION: 96 % | SYSTOLIC BLOOD PRESSURE: 96 MMHG | HEIGHT: 58 IN | HEART RATE: 76 BPM | DIASTOLIC BLOOD PRESSURE: 63 MMHG | RESPIRATION RATE: 18 BRPM | BODY MASS INDEX: 28.34 KG/M2 | WEIGHT: 135 LBS | TEMPERATURE: 97.8 F

## 2025-08-13 DIAGNOSIS — C22.9 MALIGNANT NEOPLASM OF LIVER, NOT SPECIFIED AS PRIMARY OR SECONDARY: ICD-10-CM

## 2025-08-13 DIAGNOSIS — Z04.9 ENCOUNTER FOR EXAMINATION AND OBSERVATION FOR UNSPECIFIED REASON: ICD-10-CM

## 2025-08-13 DIAGNOSIS — J31.1 CHRONIC NASOPHARYNGITIS: ICD-10-CM

## 2025-08-13 DIAGNOSIS — G56.00 CARPAL TUNNEL SYNDROME, UNSPECIFIED UPPER LIMB: ICD-10-CM

## 2025-08-13 DIAGNOSIS — J37.0 CHRONIC LARYNGITIS: ICD-10-CM

## 2025-08-13 DIAGNOSIS — M54.16 RADICULOPATHY, LUMBAR REGION: ICD-10-CM

## 2025-08-13 DIAGNOSIS — J44.9 CHRONIC OBSTRUCTIVE PULMONARY DISEASE, UNSPECIFIED: ICD-10-CM

## 2025-08-13 PROCEDURE — 94010 BREATHING CAPACITY TEST: CPT

## 2025-08-13 PROCEDURE — 99214 OFFICE O/P EST MOD 30 MIN: CPT | Mod: 25

## 2025-08-13 PROCEDURE — 99396 PREV VISIT EST AGE 40-64: CPT | Mod: 25
